# Patient Record
Sex: FEMALE | URBAN - METROPOLITAN AREA
[De-identification: names, ages, dates, MRNs, and addresses within clinical notes are randomized per-mention and may not be internally consistent; named-entity substitution may affect disease eponyms.]

---

## 2020-05-07 ENCOUNTER — APPOINTMENT (RX ONLY)
Dept: URBAN - METROPOLITAN AREA CLINIC 48 | Facility: CLINIC | Age: 55
Setting detail: DERMATOLOGY
End: 2020-05-07

## 2020-05-07 VITALS — TEMPERATURE: 97.7 F

## 2020-05-07 PROBLEM — D48.5 NEOPLASM OF UNCERTAIN BEHAVIOR OF SKIN: Status: ACTIVE | Noted: 2020-05-07

## 2020-05-07 PROCEDURE — ? COUNSELING

## 2020-05-07 PROCEDURE — ? BIOPSY BY SHAVE METHOD

## 2020-05-07 PROCEDURE — 11102 TANGNTL BX SKIN SINGLE LES: CPT

## 2020-05-07 NOTE — PROCEDURE: BIOPSY BY SHAVE METHOD
Detail Level: Detailed
Depth Of Biopsy: dermis
Was A Bandage Applied: Yes
Size Of Lesion In Cm: 0
Accession #: [id number]
Biopsy Type: H and E
Biopsy Method: Dermablade
Anesthesia Type: 1% lidocaine with epinephrine
Anesthesia Volume In Cc (Will Not Render If 0): 0.5
Hemostasis: Electrocautery and Aluminum Chloride
Wound Care: Petrolatum
Dressing: bandage
Destruction After The Procedure: No
Type Of Destruction Used: Curettage
Curettage Text: The wound bed was treated with curettage after the biopsy was performed.
Cryotherapy Text: The wound bed was treated with cryotherapy after the biopsy was performed.
Electrodesiccation Text: The wound bed was treated with electrodesiccation after the biopsy was performed.
Electrodesiccation And Curettage Text: The wound bed was treated with electrodesiccation and curettage after the biopsy was performed.
Silver Nitrate Text: The wound bed was treated with silver nitrate after the biopsy was performed.
Lab: -[id number]
Lab Facility: [id number]
Consent: Written consent was obtained and risks were reviewed including but not limited to scarring, infection, bleeding, scabbing, incomplete removal, nerve damage and allergy to anesthesia.
Post-Care Instructions: I reviewed with the patient in detail post-care instructions. Patient is to keep the biopsy site dry overnight, and then apply bacitracin twice daily until healed. Patient may apply hydrogen peroxide soaks to remove any crusting.
Notification Instructions: Patient will be notified of biopsy results. However, patient instructed to call the office if not contacted within 2 weeks.
Billing Type: Third-Party Bill
Information: Selecting Yes will display possible errors in your note based on the variables you have selected. This validation is only offered as a suggestion for you. PLEASE NOTE THAT THE VALIDATION TEXT WILL BE REMOVED WHEN YOU FINALIZE YOUR NOTE. IF YOU WANT TO FAX A PRELIMINARY NOTE YOU WILL NEED TO TOGGLE THIS TO 'NO' IF YOU DO NOT WANT IT IN YOUR FAXED NOTE.

## 2020-05-07 NOTE — HPI: SKIN LESION
Is This A New Presentation, Or A Follow-Up?: Skin Lesion
What Type Of Note Output Would You Prefer (Optional)?: Standard Output
How Severe Is Your Skin Lesion?: moderate
Has Your Skin Lesion Been Treated?: not been treated
Additional History: Patient is afebrile at presentation and denies any symptoms consistent with covid infection; denies any recent travel.

## 2023-09-21 ENCOUNTER — LAB (OUTPATIENT)
Dept: LAB | Facility: HOSPITAL | Age: 58
End: 2023-09-21

## 2023-09-21 ENCOUNTER — OFFICE VISIT (OUTPATIENT)
Dept: FAMILY MEDICINE CLINIC | Facility: CLINIC | Age: 58
End: 2023-09-21
Payer: COMMERCIAL

## 2023-09-21 VITALS
HEIGHT: 69 IN | TEMPERATURE: 98.2 F | DIASTOLIC BLOOD PRESSURE: 76 MMHG | WEIGHT: 236 LBS | BODY MASS INDEX: 34.96 KG/M2 | HEART RATE: 74 BPM | SYSTOLIC BLOOD PRESSURE: 130 MMHG

## 2023-09-21 DIAGNOSIS — Z12.31 ENCOUNTER FOR SCREENING MAMMOGRAM FOR MALIGNANT NEOPLASM OF BREAST: ICD-10-CM

## 2023-09-21 DIAGNOSIS — Z00.00 ENCOUNTER FOR ANNUAL PHYSICAL EXAM: Primary | ICD-10-CM

## 2023-09-21 DIAGNOSIS — M72.2 PLANTAR FASCIITIS OF RIGHT FOOT: ICD-10-CM

## 2023-09-21 DIAGNOSIS — Z00.00 ENCOUNTER FOR ANNUAL PHYSICAL EXAM: ICD-10-CM

## 2023-09-21 DIAGNOSIS — Z12.11 ENCOUNTER FOR SCREENING FOR MALIGNANT NEOPLASM OF COLON: ICD-10-CM

## 2023-09-21 DIAGNOSIS — Z23 NEED FOR VACCINATION: ICD-10-CM

## 2023-09-21 LAB
ALBUMIN SERPL-MCNC: 4.4 G/DL (ref 3.5–5.2)
ALBUMIN/GLOB SERPL: 1.6 G/DL
ALP SERPL-CCNC: 86 U/L (ref 39–117)
ALT SERPL W P-5'-P-CCNC: 27 U/L (ref 1–33)
ANION GAP SERPL CALCULATED.3IONS-SCNC: 9.5 MMOL/L (ref 5–15)
AST SERPL-CCNC: 25 U/L (ref 1–32)
BASOPHILS # BLD AUTO: 0.06 10*3/MM3 (ref 0–0.2)
BASOPHILS NFR BLD AUTO: 0.9 % (ref 0–1.5)
BILIRUB SERPL-MCNC: 0.5 MG/DL (ref 0–1.2)
BUN SERPL-MCNC: 12 MG/DL (ref 6–20)
BUN/CREAT SERPL: 15 (ref 7–25)
CALCIUM SPEC-SCNC: 9.9 MG/DL (ref 8.6–10.5)
CHLORIDE SERPL-SCNC: 105 MMOL/L (ref 98–107)
CHOLEST SERPL-MCNC: 159 MG/DL (ref 0–200)
CO2 SERPL-SCNC: 26.5 MMOL/L (ref 22–29)
CREAT SERPL-MCNC: 0.8 MG/DL (ref 0.57–1)
DEPRECATED RDW RBC AUTO: 41.1 FL (ref 37–54)
EGFRCR SERPLBLD CKD-EPI 2021: 86.1 ML/MIN/1.73
EOSINOPHIL # BLD AUTO: 0.15 10*3/MM3 (ref 0–0.4)
EOSINOPHIL NFR BLD AUTO: 2.3 % (ref 0.3–6.2)
ERYTHROCYTE [DISTWIDTH] IN BLOOD BY AUTOMATED COUNT: 12.4 % (ref 12.3–15.4)
GLOBULIN UR ELPH-MCNC: 2.7 GM/DL
GLUCOSE SERPL-MCNC: 91 MG/DL (ref 65–99)
HBA1C MFR BLD: 6.2 % (ref 4.8–5.6)
HCT VFR BLD AUTO: 43.6 % (ref 34–46.6)
HDLC SERPL-MCNC: 52 MG/DL (ref 40–60)
HGB BLD-MCNC: 14.7 G/DL (ref 12–15.9)
IMM GRANULOCYTES # BLD AUTO: 0.03 10*3/MM3 (ref 0–0.05)
IMM GRANULOCYTES NFR BLD AUTO: 0.5 % (ref 0–0.5)
LDLC SERPL CALC-MCNC: 94 MG/DL (ref 0–100)
LDLC/HDLC SERPL: 1.81 {RATIO}
LYMPHOCYTES # BLD AUTO: 2.07 10*3/MM3 (ref 0.7–3.1)
LYMPHOCYTES NFR BLD AUTO: 31.4 % (ref 19.6–45.3)
MCH RBC QN AUTO: 30.9 PG (ref 26.6–33)
MCHC RBC AUTO-ENTMCNC: 33.7 G/DL (ref 31.5–35.7)
MCV RBC AUTO: 91.6 FL (ref 79–97)
MONOCYTES # BLD AUTO: 0.32 10*3/MM3 (ref 0.1–0.9)
MONOCYTES NFR BLD AUTO: 4.9 % (ref 5–12)
NEUTROPHILS NFR BLD AUTO: 3.96 10*3/MM3 (ref 1.7–7)
NEUTROPHILS NFR BLD AUTO: 60 % (ref 42.7–76)
NRBC BLD AUTO-RTO: 0 /100 WBC (ref 0–0.2)
PLATELET # BLD AUTO: 266 10*3/MM3 (ref 140–450)
PMV BLD AUTO: 10.3 FL (ref 6–12)
POTASSIUM SERPL-SCNC: 4.4 MMOL/L (ref 3.5–5.2)
PROT SERPL-MCNC: 7.1 G/DL (ref 6–8.5)
RBC # BLD AUTO: 4.76 10*6/MM3 (ref 3.77–5.28)
SODIUM SERPL-SCNC: 141 MMOL/L (ref 136–145)
T4 FREE SERPL-MCNC: 1.17 NG/DL (ref 0.93–1.7)
TRIGL SERPL-MCNC: 65 MG/DL (ref 0–150)
TSH SERPL DL<=0.05 MIU/L-ACNC: 4.51 UIU/ML (ref 0.27–4.2)
VLDLC SERPL-MCNC: 13 MG/DL (ref 5–40)
WBC NRBC COR # BLD: 6.59 10*3/MM3 (ref 3.4–10.8)

## 2023-09-21 PROCEDURE — 80061 LIPID PANEL: CPT

## 2023-09-21 PROCEDURE — 80050 GENERAL HEALTH PANEL: CPT

## 2023-09-21 PROCEDURE — 84439 ASSAY OF FREE THYROXINE: CPT

## 2023-09-21 PROCEDURE — 83036 HEMOGLOBIN GLYCOSYLATED A1C: CPT

## 2023-09-21 NOTE — PROGRESS NOTES
Subjective     Chief Complaint  Foot Injury (Believes she has plantar fascitis ) and Immunizations (Zoster)    Subjective          Amanda Pack is a 58 y.o. female who presents today to [facility] for initial evaluation .    HPI:   History of Present Illness      Ms. Pack is a pleasant 58-year-old female presents today to establish care with a primary care physician and have a annual physical exam.  It does not appear that she has any significant past medical history.  She is not taking any chronic medications.  She does have a past medical history of diabetes.  She would like to update her immunizations.      She has an acute complaint of a foot injury.  She is concern for Planter fasciitis. She has has plantar fascitis in the past. She was able to use stretching exercising. Frozen water bottle etc. She reports that this episode has been so painful that it is interfering with her activities. She has been taking aleve/ibuprofen with some benefit but no long term. She wears supportive shoes routinely.     The following portions of the patient's history were reviewed and updated as appropriate: allergies, current medications, past family history, past medical history, past social history, past surgical history and problem list.    Objective     Objective     Allergy:   No Known Allergies     Current Medications:   No current outpatient medications on file.     No current facility-administered medications for this visit.       Past Medical History:  History reviewed. No pertinent past medical history.    Past Surgical History:  Past Surgical History:   Procedure Laterality Date    BILATERAL BREAST REDUCTION       SECTION      ENDOMETRIAL ABLATION         Social History:  Social History     Socioeconomic History    Marital status:    Tobacco Use    Smoking status: Never     Passive exposure: Never    Smokeless tobacco: Never   Vaping Use    Vaping Use: Never used  "  Substance and Sexual Activity    Alcohol use: Yes     Comment: 1-2 glasses occasionally    Drug use: Never    Sexual activity: Defer       Family History:  Family History   Problem Relation Age of Onset    Cancer Sister     Diabetes Paternal Uncle            Vital Signs:   /76   Pulse 74   Temp 98.2 °F (36.8 °C) (Infrared)   Ht 175.9 cm (69.25\")   Wt 107 kg (236 lb)   BMI 34.60 kg/m²      Physical Exam:  Physical Exam  Constitutional:       Appearance: She is normal weight.   HENT:      Head: Normocephalic.      Right Ear: Tympanic membrane normal.   Cardiovascular:      Rate and Rhythm: Normal rate and regular rhythm.      Pulses: Normal pulses.      Heart sounds: No murmur heard.  Pulmonary:      Effort: Pulmonary effort is normal. No respiratory distress.      Breath sounds: No wheezing.   Abdominal:      General: Abdomen is flat. Bowel sounds are normal.   Musculoskeletal:         General: No swelling or tenderness.      Cervical back: Normal range of motion.   Neurological:      General: No focal deficit present.      Mental Status: She is alert. Mental status is at baseline.   Psychiatric:         Mood and Affect: Mood normal.         Behavior: Behavior normal.         Thought Content: Thought content normal.         Judgment: Judgment normal.             PHQ-9 Score  PHQ-9 Total Score: 0     Lab Review  No results found for any previous visit.        Radiology Results        Assessment / Plan         Assessment and Plan   Diagnoses and all orders for this visit:    1. Encounter for annual physical exam (Primary)  Assessment & Plan:  Annual wellness exam completed today. Health Maintenance including immunizations was updated and reflected in the chart. Yearly screening labs were ordered.     Further recommendations to be given once lab data received.     Health advice: healthy food choices with fresh fruits and vegetables, maintain sleep pattern at least 8 hours, avoid texting and distracted " driving practices; wear safety belt, engage in regular exercise, maintain healthy weight, use safe sex practices, avoid alcohol and illicit drugs. Maintain immunizations that are up to date. Maintain health maintenance: Colonoscopy DEXA, Mammo, PAP, etc.  Follow up with PCP if struggling with depression or anxiety. Keep regular dental and eye exams. Brush and floss teeth daily.     F/U annually and prn.       Orders:  -     CBC & Differential; Future  -     Comprehensive Metabolic Panel; Future  -     Hemoglobin A1c; Future  -     Lipid Panel; Future  -     TSH Rfx On Abnormal To Free T4; Future    2. Plantar fasciitis of right foot  -     Ambulatory Referral to Podiatry    3. Encounter for screening mammogram for malignant neoplasm of breast  -     Mammo Screening Digital Tomosynthesis Bilateral With CAD; Future    4. Encounter for screening for malignant neoplasm of colon  -     Ambulatory Referral to Gastroenterology    5. Need for vaccination  -     Shingrix Vaccine        Discussed possible differential diagnoses, testing, treatment, recommended non-pharmacological interventions, risks, warning signs to monitor for that would indicate need for follow-up in clinic or ER. If no improvement with these regimens or you have new or worsening symptoms follow-up. Patient verbalizes understanding and agreement with plan of care. Denies further needs or concerns.     Patient was given instructions and counseling regarding her condition and for health maintenance advised.    BMI is >= 30 and <35. (Class 1 Obesity). The following options were offered after discussion;: weight loss educational material (shared in after visit summary)         Health Maintenance  Health Maintenance:   Health Maintenance Due   Topic Date Due    MAMMOGRAM  Never done    BMI FOLLOWUP  Never done    HEPATITIS C SCREENING  Never done        Meds ordered during this visit  No orders of the defined types were placed in this encounter.      Meds  stopped during this visit:  There are no discontinued medications.     Visit Diagnoses    ICD-10-CM ICD-9-CM   1. Encounter for annual physical exam  Z00.00 V70.0   2. Plantar fasciitis of right foot  M72.2 728.71   3. Encounter for screening mammogram for malignant neoplasm of breast  Z12.31 V76.12   4. Encounter for screening for malignant neoplasm of colon  Z12.11 V76.51   5. Need for vaccination  Z23 V05.9       Patient was given instructions and counseling regarding her condition or for health maintenance advice. Please see specific information pulled into the AVS if appropriate.     Follow Up   Return for Annual.          This document has been electronically signed by Marzena Whittington DO   September 21, 2023 10:38 EDT    Dictated Utilizing Dragon Dictation: Part of this note may be an electronic transcription/translation of spoken language to printed text using the Dragon Dictation System.    Marzena Whittington D.O.  [facility] [place]

## 2023-09-21 NOTE — LETTER
[facility]  Vaccine Consent Form    Patient Name:  Amanda Pack  Patient :  1965     Vaccine(s) Ordered    Shingrix Vaccine        Screening Checklist  The following questions should be completed prior to vaccination. If you answer “yes” to any question, it does not necessarily mean you should not be vaccinated. It just means we may need to clarify or ask more questions. If a question is unclear, please ask your healthcare provider to explain it.    Yes No   Any fever or moderate to severe illness today (mild illness and/or antibiotic treatment are not contraindications)?     Do you have a history of a serious reaction to any previous vaccinations, such as anaphylaxis, encephalopathy within 7 days, Guillain-Berkeley syndrome within 6 weeks, seizure?     Have you received any live vaccine(s) in the past month (MMR, SARA)?     Do you have an anaphylactic allergy to latex (DTaP, DTaP-IPV, Hep A, Hep B, MenB, RV, Td, Tdap), baker’s yeast (Hep B, HPV), or gelatin (SARA, MMR)?     Do you have an anaphylactic allergy to neomycin (Rabies, SARA, MMR, IPV, Hep A), polymyxin B (IPV), or streptomycin (IPV)?      Any cancer, leukemia, AIDS, or other immune system disorder? (SARA, MMR, RV)     Do you have a parent, brother, or sister with an immune system problem (if immune competence of vaccine recipient clinically verified, can proceed)? (MMR, SARA)     Any recent steroid treatments for >2 weeks, chemotherapy, or radiation treatment? (SARA, MMR)     Have you received antibody-containing blood transfusions or IVIG in the past 11 months (recommended interval is dependent on product)? (MMR, SARA)     Have you taken antiviral drugs (acyclovir, famciclovir, valacyclovir) in the last 24 or 48 hours, respectively (SRAA)?      Are you pregnant or planning to become pregnant within 1 month? (SARA, MMR, HPV, IPV, MenB; For hep B- refer to Engerix-B)     For infants, have you ever been told your child has had intussusception or a medical  emergency involving obstruction of the intestine (RV)? If not for an infant, can skip this question.         *Ordering Physician/APC should be consulted if “yes” is checked by the patient or guardian above.      I have received, read, and understand the Vaccine Information Statement (VIS) for each vaccine ordered above.  I have considered my health status as well as the health status of my close contacts.  I have taken the opportunity to discuss my vaccine questions with my health care provider.   I have requested that the ordered vaccine(s) be given to me.  I understand the benefits and risks of the vaccines.  I understand that I should remain in the clinic for 15 minutes after receiving the vaccine(s).  _________________________________________________________  Signature of Patient or Parent/Legal Guardian ____________________  Date

## 2023-09-21 NOTE — ASSESSMENT & PLAN NOTE
Annual wellness exam completed today. Health Maintenance including immunizations was updated and reflected in the chart. Yearly screening labs were ordered.     Further recommendations to be given once lab data received.     Health advice: healthy food choices with fresh fruits and vegetables, maintain sleep pattern at least 8 hours, avoid texting and distracted driving practices; wear safety belt, engage in regular exercise, maintain healthy weight, use safe sex practices, avoid alcohol and illicit drugs. Maintain immunizations that are up to date. Maintain health maintenance: Colonoscopy DEXA, Mammo, PAP, etc.  Follow up with PCP if struggling with depression or anxiety. Keep regular dental and eye exams. Brush and floss teeth daily.     F/U annually and prn.

## 2023-09-22 DIAGNOSIS — R94.6 ABNORMAL RESULTS OF THYROID FUNCTION STUDIES: Primary | ICD-10-CM

## 2023-11-06 RX ORDER — SODIUM, POTASSIUM,MAG SULFATES 17.5-3.13G
2 SOLUTION, RECONSTITUTED, ORAL ORAL TAKE AS DIRECTED
Qty: 354 ML | Refills: 0 | Status: SHIPPED | OUTPATIENT
Start: 2023-11-06

## 2023-11-09 ENCOUNTER — HOSPITAL ENCOUNTER (OUTPATIENT)
Dept: MAMMOGRAPHY | Facility: HOSPITAL | Age: 58
Discharge: HOME | End: 2023-11-09
Admitting: FAMILY MEDICINE
Payer: COMMERCIAL

## 2023-11-09 ENCOUNTER — APPOINTMENT (OUTPATIENT)
Dept: OTHER | Facility: HOSPITAL | Age: 58
End: 2023-11-09
Payer: COMMERCIAL

## 2023-11-09 DIAGNOSIS — Z12.31 ENCOUNTER FOR SCREENING MAMMOGRAM FOR MALIGNANT NEOPLASM OF BREAST: ICD-10-CM

## 2023-11-09 PROCEDURE — 77063 BREAST TOMOSYNTHESIS BI: CPT

## 2023-11-09 PROCEDURE — 77067 SCR MAMMO BI INCL CAD: CPT

## 2023-11-13 ENCOUNTER — OUTSIDE FACILITY SERVICE (OUTPATIENT)
Dept: GASTROENTEROLOGY | Facility: CLINIC | Age: 58
End: 2023-11-13
Payer: COMMERCIAL

## 2023-11-13 PROCEDURE — 45380 COLONOSCOPY AND BIOPSY: CPT | Performed by: INTERNAL MEDICINE

## 2023-11-13 PROCEDURE — 88305 TISSUE EXAM BY PATHOLOGIST: CPT

## 2023-11-14 ENCOUNTER — LAB REQUISITION (OUTPATIENT)
Dept: LAB | Facility: HOSPITAL | Age: 58
End: 2023-11-14
Payer: COMMERCIAL

## 2023-11-14 DIAGNOSIS — Z86.010 PERSONAL HISTORY OF COLONIC POLYPS: ICD-10-CM

## 2023-11-14 DIAGNOSIS — Z12.11 ENCOUNTER FOR SCREENING FOR MALIGNANT NEOPLASM OF COLON: ICD-10-CM

## 2023-11-14 DIAGNOSIS — D12.8 BENIGN NEOPLASM OF RECTUM: ICD-10-CM

## 2023-11-15 LAB — REF LAB TEST METHOD: NORMAL

## 2024-01-03 ENCOUNTER — HOSPITAL ENCOUNTER (OUTPATIENT)
Dept: MAMMOGRAPHY | Facility: HOSPITAL | Age: 59
Discharge: HOME | End: 2024-01-03
Admitting: RADIOLOGY
Payer: COMMERCIAL

## 2024-01-03 DIAGNOSIS — R92.8 ABNORMAL MAMMOGRAM: ICD-10-CM

## 2024-01-03 PROCEDURE — G0279 TOMOSYNTHESIS, MAMMO: HCPCS

## 2024-01-03 PROCEDURE — 77065 DX MAMMO INCL CAD UNI: CPT

## 2024-07-23 ENCOUNTER — PATIENT ROUNDING (BHMG ONLY) (OUTPATIENT)
Dept: URGENT CARE | Facility: CLINIC | Age: 59
End: 2024-07-23
Payer: COMMERCIAL

## 2024-07-23 NOTE — ED NOTES
Thank you for letting us care for you in your recent visit to our urgent care center. We would love to hear about your experience with us. Was this the first time you have visited our location?    We’re always looking for ways to make our patients’ experiences even better. Do you have any recommendations on ways we may improve?     I appreciate you taking the time to respond. Please be on the lookout for a survey about your recent visit from [employer] via text or email. We would greatly appreciate if you could fill that out and turn it back in. We want your voice to be heard and we value your feedback.   Thank you for choosing [facility] for your healthcare needs.

## 2024-09-29 LAB
NCCN CRITERIA FLAG: NORMAL
TYRER CUZICK SCORE: 9.7

## 2024-10-14 ENCOUNTER — HOSPITAL ENCOUNTER (OUTPATIENT)
Facility: HOSPITAL | Age: 59
Discharge: HOME | End: 2024-10-14
Admitting: RADIOLOGY
Payer: COMMERCIAL

## 2024-10-14 DIAGNOSIS — R92.8 ABNORMAL MAMMOGRAM: ICD-10-CM

## 2024-10-14 PROCEDURE — 77062 BREAST TOMOSYNTHESIS BI: CPT | Performed by: RADIOLOGY

## 2024-10-14 PROCEDURE — 77066 DX MAMMO INCL CAD BI: CPT

## 2024-10-14 PROCEDURE — G0279 TOMOSYNTHESIS, MAMMO: HCPCS

## 2024-10-14 PROCEDURE — 77066 DX MAMMO INCL CAD BI: CPT | Performed by: RADIOLOGY

## 2024-10-14 PROCEDURE — 77065 DX MAMMO INCL CAD UNI: CPT

## 2024-10-28 ENCOUNTER — OFFICE VISIT (OUTPATIENT)
Dept: FAMILY MEDICINE CLINIC | Facility: CLINIC | Age: 59
End: 2024-10-28
Payer: COMMERCIAL

## 2024-10-28 ENCOUNTER — LAB (OUTPATIENT)
Dept: LAB | Facility: HOSPITAL | Age: 59
End: 2024-10-28
Payer: COMMERCIAL

## 2024-10-28 VITALS
SYSTOLIC BLOOD PRESSURE: 98 MMHG | HEIGHT: 69 IN | BODY MASS INDEX: 35.43 KG/M2 | WEIGHT: 239.2 LBS | HEART RATE: 72 BPM | DIASTOLIC BLOOD PRESSURE: 60 MMHG | TEMPERATURE: 98.6 F | OXYGEN SATURATION: 97 %

## 2024-10-28 DIAGNOSIS — Z11.59 NEED FOR HEPATITIS C SCREENING TEST: ICD-10-CM

## 2024-10-28 DIAGNOSIS — Z00.00 ENCOUNTER FOR ANNUAL PHYSICAL EXAM: ICD-10-CM

## 2024-10-28 DIAGNOSIS — Z78.0 POST-MENOPAUSAL: ICD-10-CM

## 2024-10-28 DIAGNOSIS — R73.03 PREDIABETES: ICD-10-CM

## 2024-10-28 DIAGNOSIS — Z00.00 ENCOUNTER FOR ANNUAL PHYSICAL EXAM: Primary | ICD-10-CM

## 2024-10-28 DIAGNOSIS — R20.2 PARESTHESIA OF SKIN: ICD-10-CM

## 2024-10-28 DIAGNOSIS — Z23 NEED FOR VACCINATION: ICD-10-CM

## 2024-10-28 LAB
25(OH)D3 SERPL-MCNC: 25.6 NG/ML (ref 30–100)
ALBUMIN SERPL-MCNC: 4.4 G/DL (ref 3.5–5.2)
ALBUMIN/GLOB SERPL: 1.3 G/DL
ALP SERPL-CCNC: 99 U/L (ref 39–117)
ALT SERPL W P-5'-P-CCNC: 34 U/L (ref 1–33)
ANION GAP SERPL CALCULATED.3IONS-SCNC: 7.6 MMOL/L (ref 5–15)
AST SERPL-CCNC: 24 U/L (ref 1–32)
BASOPHILS # BLD AUTO: 0.05 10*3/MM3 (ref 0–0.2)
BASOPHILS NFR BLD AUTO: 0.7 % (ref 0–1.5)
BILIRUB SERPL-MCNC: 0.6 MG/DL (ref 0–1.2)
BUN SERPL-MCNC: 14 MG/DL (ref 6–20)
BUN/CREAT SERPL: 13.3 (ref 7–25)
CALCIUM SPEC-SCNC: 10.1 MG/DL (ref 8.6–10.5)
CHLORIDE SERPL-SCNC: 102 MMOL/L (ref 98–107)
CHOLEST SERPL-MCNC: 167 MG/DL (ref 0–200)
CO2 SERPL-SCNC: 27.4 MMOL/L (ref 22–29)
CREAT SERPL-MCNC: 1.05 MG/DL (ref 0.57–1)
DEPRECATED RDW RBC AUTO: 41.6 FL (ref 37–54)
EGFRCR SERPLBLD CKD-EPI 2021: 61.7 ML/MIN/1.73
EOSINOPHIL # BLD AUTO: 0.15 10*3/MM3 (ref 0–0.4)
EOSINOPHIL NFR BLD AUTO: 2.2 % (ref 0.3–6.2)
ERYTHROCYTE [DISTWIDTH] IN BLOOD BY AUTOMATED COUNT: 12.1 % (ref 12.3–15.4)
FERRITIN SERPL-MCNC: 194 NG/ML (ref 13–150)
FOLATE SERPL-MCNC: 7.91 NG/ML (ref 4.78–24.2)
FSH SERPL-ACNC: 44.5 MIU/ML
GLOBULIN UR ELPH-MCNC: 3.3 GM/DL
GLUCOSE SERPL-MCNC: 130 MG/DL (ref 65–99)
HBA1C MFR BLD: 6.7 % (ref 4.8–5.6)
HCT VFR BLD AUTO: 45.4 % (ref 34–46.6)
HCV AB SER QL: NORMAL
HDLC SERPL-MCNC: 51 MG/DL (ref 40–60)
HGB BLD-MCNC: 15.6 G/DL (ref 12–15.9)
IMM GRANULOCYTES # BLD AUTO: 0.02 10*3/MM3 (ref 0–0.05)
IMM GRANULOCYTES NFR BLD AUTO: 0.3 % (ref 0–0.5)
IRON 24H UR-MRATE: 134 MCG/DL (ref 37–145)
IRON SATN MFR SERPL: 34 % (ref 20–50)
LDLC SERPL CALC-MCNC: 101 MG/DL (ref 0–100)
LDLC/HDLC SERPL: 1.97 {RATIO}
LH SERPL-ACNC: 23.8 MIU/ML
LYMPHOCYTES # BLD AUTO: 2.06 10*3/MM3 (ref 0.7–3.1)
LYMPHOCYTES NFR BLD AUTO: 30.4 % (ref 19.6–45.3)
MCH RBC QN AUTO: 32.4 PG (ref 26.6–33)
MCHC RBC AUTO-ENTMCNC: 34.4 G/DL (ref 31.5–35.7)
MCV RBC AUTO: 94.4 FL (ref 79–97)
MONOCYTES # BLD AUTO: 0.3 10*3/MM3 (ref 0.1–0.9)
MONOCYTES NFR BLD AUTO: 4.4 % (ref 5–12)
NEUTROPHILS NFR BLD AUTO: 4.19 10*3/MM3 (ref 1.7–7)
NEUTROPHILS NFR BLD AUTO: 62 % (ref 42.7–76)
NRBC BLD AUTO-RTO: 0 /100 WBC (ref 0–0.2)
PLATELET # BLD AUTO: 302 10*3/MM3 (ref 140–450)
PMV BLD AUTO: 10.1 FL (ref 6–12)
POTASSIUM SERPL-SCNC: 4.2 MMOL/L (ref 3.5–5.2)
PROLACTIN SERPL-MCNC: 8.38 NG/ML (ref 4.79–23.3)
PROT SERPL-MCNC: 7.7 G/DL (ref 6–8.5)
RBC # BLD AUTO: 4.81 10*6/MM3 (ref 3.77–5.28)
SODIUM SERPL-SCNC: 137 MMOL/L (ref 136–145)
T4 FREE SERPL-MCNC: 1.18 NG/DL (ref 0.92–1.68)
TIBC SERPL-MCNC: 399 MCG/DL (ref 298–536)
TRANSFERRIN SERPL-MCNC: 268 MG/DL (ref 200–360)
TRIGL SERPL-MCNC: 78 MG/DL (ref 0–150)
TSH SERPL DL<=0.05 MIU/L-ACNC: 5.69 UIU/ML (ref 0.27–4.2)
VIT B12 BLD-MCNC: 604 PG/ML (ref 211–946)
VLDLC SERPL-MCNC: 15 MG/DL (ref 5–40)
WBC NRBC COR # BLD AUTO: 6.77 10*3/MM3 (ref 3.4–10.8)

## 2024-10-28 PROCEDURE — 82626 DEHYDROEPIANDROSTERONE: CPT

## 2024-10-28 PROCEDURE — 80061 LIPID PANEL: CPT

## 2024-10-28 PROCEDURE — 83002 ASSAY OF GONADOTROPIN (LH): CPT

## 2024-10-28 PROCEDURE — 83540 ASSAY OF IRON: CPT

## 2024-10-28 PROCEDURE — 84439 ASSAY OF FREE THYROXINE: CPT

## 2024-10-28 PROCEDURE — 84146 ASSAY OF PROLACTIN: CPT

## 2024-10-28 PROCEDURE — 82672 ASSAY OF ESTROGEN: CPT

## 2024-10-28 PROCEDURE — 82607 VITAMIN B-12: CPT

## 2024-10-28 PROCEDURE — 99396 PREV VISIT EST AGE 40-64: CPT | Performed by: FAMILY MEDICINE

## 2024-10-28 PROCEDURE — 86803 HEPATITIS C AB TEST: CPT

## 2024-10-28 PROCEDURE — 83001 ASSAY OF GONADOTROPIN (FSH): CPT

## 2024-10-28 PROCEDURE — 90656 IIV3 VACC NO PRSV 0.5 ML IM: CPT | Performed by: FAMILY MEDICINE

## 2024-10-28 PROCEDURE — 84466 ASSAY OF TRANSFERRIN: CPT

## 2024-10-28 PROCEDURE — 82746 ASSAY OF FOLIC ACID SERUM: CPT

## 2024-10-28 PROCEDURE — 80050 GENERAL HEALTH PANEL: CPT

## 2024-10-28 PROCEDURE — 82306 VITAMIN D 25 HYDROXY: CPT

## 2024-10-28 PROCEDURE — 90471 IMMUNIZATION ADMIN: CPT | Performed by: FAMILY MEDICINE

## 2024-10-28 PROCEDURE — 36415 COLL VENOUS BLD VENIPUNCTURE: CPT

## 2024-10-28 PROCEDURE — 83036 HEMOGLOBIN GLYCOSYLATED A1C: CPT

## 2024-10-28 PROCEDURE — 82728 ASSAY OF FERRITIN: CPT

## 2024-10-28 RX ORDER — PHENOL 1.4 %
1 AEROSOL, SPRAY (ML) MUCOUS MEMBRANE DAILY
COMMUNITY

## 2024-10-28 NOTE — PROGRESS NOTES
Subjective     Chief Complaint  Annual Exam (Thinks she's having symptoms related to menopause)    Subjective          Amanda Morenita Pack is a 59 y.o. female who presents today to [facility] for follow up.    HPI:   History of Present Illness    Ms. Pack is a pleasant 59-year-old female presents today to have a annual physical exam.  She does not take any chronic medications.     She has been having some concerns about possible menopausal symptoms.  She states that she just doesn't feel physically herself. Sometimes she has some feet burning at night and soreness especially in the morning. Along with some muscle spasm at times, in the inner thigh. She is having some joint achiness as well as low energy and lack of focus. Sometimes she feels as if she is losing her words too. She has been feeling more issues with relaxation that isn't typical for her, as well. Symptoms are positive for vaginal dryness and low libido. Additionally she has some reflux at times. Her last menstrual period was in .     The following portions of the patient's history were reviewed and updated as appropriate: allergies, current medications, past family history, past medical history, past social history, past surgical history and problem list.    Objective     Objective     Allergy:   No Known Allergies     Current Medications:   Current Outpatient Medications   Medication Sig Dispense Refill    multivitamin with minerals (Multivitamin Women) tablet tablet Take 1 tablet by mouth Daily.       No current facility-administered medications for this visit.       Past Medical History:  History reviewed. No pertinent past medical history.    Past Surgical History:  Past Surgical History:   Procedure Laterality Date     SECTION      ENDOMETRIAL ABLATION      REDUCTION MAMMAPLASTY Bilateral 10/13/2022       Social History:  Social History     Socioeconomic History    Marital status:    Tobacco  "Use    Smoking status: Never     Passive exposure: Never    Smokeless tobacco: Never   Vaping Use    Vaping status: Never Used   Substance and Sexual Activity    Alcohol use: Yes     Comment: 1-2 glasses occasionally    Drug use: Never    Sexual activity: Defer       Family History:  Family History   Problem Relation Age of Onset    Cancer Sister     Diabetes Paternal Uncle     Breast cancer Neg Hx     Ovarian cancer Neg Hx          Vital Signs:   BP 98/60   Pulse 72   Temp 98.6 °F (37 °C) (Temporal)   Ht 175.3 cm (69.02\")   Wt 109 kg (239 lb 3.2 oz)   SpO2 97%   BMI 35.31 kg/m²      Physical Exam:  Physical Exam  Vitals reviewed.   Constitutional:       Appearance: She is not ill-appearing.   Cardiovascular:      Rate and Rhythm: Normal rate.      Pulses: Normal pulses.   Pulmonary:      Effort: Pulmonary effort is normal.      Breath sounds: Normal breath sounds.   Neurological:      General: No focal deficit present.      Mental Status: She is alert. Mental status is at baseline.   Psychiatric:         Mood and Affect: Mood normal.         Behavior: Behavior normal.         Thought Content: Thought content normal.         Judgment: Judgment normal.               PHQ-9 Score  PHQ-9 Total Score:      Lab Review  Orders Only on 09/29/2024   Component Date Value Ref Range Status    TyrerCuzick 09/29/2024 9.7   Final    NCCN 09/29/2024 NCCN not met   Final    High Risk Cancer Risk Assessment        Radiology Results  Mammo Diagnostic Digital Tomosynthesis Bilateral With CAD    Result Date: 10/14/2024  Impression: 1. Right breast: Stable focal asymmetry in the upper outer quadrant of the right breast for which the patient is in short-term follow-up. Previously seen probably benign calcifications are much less conspicuous. Follow-up diagnostic right mammogram in 6 months is recommended to ensure stability/establish new baseline after surgery. 2. Left breast: No suspicious abnormality is seen. Recommendation is for " continued routine annual screening mammogram. 3. Today's findings and recommendations were discussed with the patient at the time of the examination by the breast care team.  OVERALL ASSESSMENT: BI-RADS Category 3: Probably benign. Recommend short-term follow-up diagnostic right mammogram in 6 months.  ________________________________________________________________________ _______ Physicians Order  Diagnostic right mammogram and 6-months.  Diagnosis: Short-term follow-up probably benign findings  This report was finalized on 10/14/2024 9:20 AM by Yoselin Finnegan MD.        Assessment / Plan         Assessment and Plan   Diagnoses and all orders for this visit:    1. Encounter for annual physical exam (Primary)  Assessment & Plan:  Annual wellness exam completed today. Health Maintenance including immunizations was updated and reflected in the chart. Yearly screening labs were ordered.     Further recommendations to be given once lab data received.     Health advice: healthy food choices with fresh fruits and vegetables, maintain sleep pattern at least 8 hours, avoid texting and distracted driving practices; wear safety belt, engage in regular exercise, maintain healthy weight, use safe sex practices, avoid alcohol and illicit drugs. Maintain immunizations that are up to date. Maintain health maintenance:Colon cancer screening, Mammo, PAP, etc.  Follow up with PCP if struggling with depression or anxiety. Keep regular dental and eye exams. Brush and floss teeth daily.     I suggest they take a daily multivitamin that is age-appropriate (example women's One-A-Day.)  Patient voiced understanding of these instructions.     Follow up Annually for Physical       Orders:  -     Comprehensive Metabolic Panel; Future  -     CBC & Differential; Future  -     Hemoglobin A1c; Future  -     Lipid Panel; Future  -     TSH Rfx On Abnormal To Free T4; Future  -     Vitamin D,25-Hydroxy; Future  -     Vitamin B12; Future    2.  Need for hepatitis C screening test  -     Hepatitis C Antibody; Future    3. Prediabetes  -     Hemoglobin A1c; Future    4. Paresthesia of skin  -     TSH Rfx On Abnormal To Free T4; Future  -     Vitamin D,25-Hydroxy; Future  -     Vitamin B12; Future  -     Iron Profile; Future  -     Ferritin; Future  -     Folate; Future    5. Need for vaccination  -     Fluzone >6mos ([id number])    6. Post-menopausal  -     FSH & LH; Future  -     Prolactin; Future  -     Estrogens, Total; Future  -     DHEA; Future        Discussed possible differential diagnoses, testing, treatment, recommended non-pharmacological interventions, risks, warning signs to monitor for that would indicate need for follow-up in clinic or ER. If no improvement with these regimens or you have new or worsening symptoms follow-up. Patient verbalizes understanding and agreement with plan of care. Denies further needs or concerns.     Patient was given instructions and counseling regarding her condition and for health maintenance advised.    Class 2 Severe Obesity (BMI >=35 and <=39.9). Obesity-related health conditions include the following: none. Obesity is unchanged. BMI is is above average; BMI management plan is completed. We discussed Information on healthy weight added to patient's after visit summary.                  Health Maintenance  Health Maintenance:   Health Maintenance Due   Topic Date Due    HEPATITIS C SCREENING  Never done    PAP SMEAR  Never done    COVID-19 Vaccine (4 - 2023-24 season) 09/01/2024        Meds ordered during this visit  No orders of the defined types were placed in this encounter.      Meds stopped during this visit:  Medications Discontinued During This Encounter   Medication Reason    cefdinir (OMNICEF) 300 MG capsule *Therapy completed    predniSONE (DELTASONE) 10 MG tablet *Therapy completed        Visit Diagnoses    ICD-10-CM ICD-9-CM   1. Encounter for annual physical exam  Z00.00 V70.0   2. Need for hepatitis  C screening test  Z11.59 V73.89   3. Prediabetes  R73.03 790.29   4. Paresthesia of skin  R20.2 782.0   5. Need for vaccination  Z23 V05.9   6. Post-menopausal  Z78.0 V49.81       Patient was given instructions and counseling regarding her condition or for health maintenance advice. Please see specific information pulled into the AVS if appropriate.     Follow Up   Return in about 1 year (around 10/28/2025) for Annual.      This document has been electronically signed by Marzena Whittington DO   October 28, 2024 08:23 EDT    Dictated Utilizing Dragon Dictation: Part of this note may be an electronic transcription/translation of spoken language to printed text using the Dragon Dictation System.    Marzena Whittington D.O.  [facility] [place]

## 2024-10-28 NOTE — ASSESSMENT & PLAN NOTE
Annual wellness exam completed today. Health Maintenance including immunizations was updated and reflected in the chart. Yearly screening labs were ordered.     Further recommendations to be given once lab data received.     Health advice: healthy food choices with fresh fruits and vegetables, maintain sleep pattern at least 8 hours, avoid texting and distracted driving practices; wear safety belt, engage in regular exercise, maintain healthy weight, use safe sex practices, avoid alcohol and illicit drugs. Maintain immunizations that are up to date. Maintain health maintenance:Colon cancer screening, Mammo, PAP, etc.  Follow up with PCP if struggling with depression or anxiety. Keep regular dental and eye exams. Brush and floss teeth daily.     I suggest they take a daily multivitamin that is age-appropriate (example women's One-A-Day.)  Patient voiced understanding of these instructions.     Follow up Annually for Physical

## 2024-10-29 DIAGNOSIS — R73.03 PREDIABETES: Primary | ICD-10-CM

## 2024-10-29 DIAGNOSIS — Z13.6 SCREENING FOR CARDIOVASCULAR CONDITION: ICD-10-CM

## 2024-11-02 LAB — ESTROGEN SERPL-MCNC: 87 PG/ML (ref 40–244)

## 2024-11-03 LAB — DHEA SERPL-MCNC: 238 NG/DL (ref 21–402)

## 2024-11-11 DIAGNOSIS — R79.89 ELEVATED SERUM CREATININE: Primary | ICD-10-CM

## 2024-11-11 DIAGNOSIS — Z01.419 WELL WOMAN EXAM: ICD-10-CM

## 2024-11-11 DIAGNOSIS — Z78.0 POST-MENOPAUSAL: Primary | ICD-10-CM

## 2024-11-11 DIAGNOSIS — R79.89 ABNORMAL THYROID STIMULATING HORMONE (TSH) LEVEL: ICD-10-CM

## 2024-11-26 ENCOUNTER — LAB (OUTPATIENT)
Dept: LAB | Facility: HOSPITAL | Age: 59
End: 2024-11-26

## 2024-11-26 ENCOUNTER — HOSPITAL ENCOUNTER (OUTPATIENT)
Facility: HOSPITAL | Age: 59
Discharge: HOME | End: 2024-11-26

## 2024-11-26 DIAGNOSIS — R79.89 ABNORMAL THYROID STIMULATING HORMONE (TSH) LEVEL: ICD-10-CM

## 2024-11-26 DIAGNOSIS — Z13.6 SCREENING FOR CARDIOVASCULAR CONDITION: ICD-10-CM

## 2024-11-26 DIAGNOSIS — R79.89 ELEVATED SERUM CREATININE: ICD-10-CM

## 2024-11-26 DIAGNOSIS — R73.03 PREDIABETES: ICD-10-CM

## 2024-11-26 LAB
ANION GAP SERPL CALCULATED.3IONS-SCNC: 7.9 MMOL/L (ref 5–15)
BUN SERPL-MCNC: 16 MG/DL (ref 6–20)
BUN/CREAT SERPL: 20.3 (ref 7–25)
CALCIUM SPEC-SCNC: 10 MG/DL (ref 8.6–10.5)
CHLORIDE SERPL-SCNC: 107 MMOL/L (ref 98–107)
CO2 SERPL-SCNC: 27.1 MMOL/L (ref 22–29)
CREAT SERPL-MCNC: 0.79 MG/DL (ref 0.57–1)
EGFRCR SERPLBLD CKD-EPI 2021: 86.8 ML/MIN/1.73
GLUCOSE SERPL-MCNC: 99 MG/DL (ref 65–99)
POTASSIUM SERPL-SCNC: 4.3 MMOL/L (ref 3.5–5.2)
SODIUM SERPL-SCNC: 142 MMOL/L (ref 136–145)
T4 FREE SERPL-MCNC: 1.19 NG/DL (ref 0.92–1.68)
TSH SERPL DL<=0.05 MIU/L-ACNC: 6.11 UIU/ML (ref 0.27–4.2)

## 2024-11-26 PROCEDURE — 84439 ASSAY OF FREE THYROXINE: CPT

## 2024-11-26 PROCEDURE — 36415 COLL VENOUS BLD VENIPUNCTURE: CPT

## 2024-11-26 PROCEDURE — 75571 CT HRT W/O DYE W/CA TEST: CPT

## 2024-11-26 PROCEDURE — 84443 ASSAY THYROID STIM HORMONE: CPT

## 2024-11-26 PROCEDURE — 80048 BASIC METABOLIC PNL TOTAL CA: CPT

## 2024-11-27 RX ORDER — LEVOTHYROXINE SODIUM 25 UG/1
25 TABLET ORAL DAILY
Qty: 90 TABLET | Refills: 1 | Status: SHIPPED | OUTPATIENT
Start: 2024-11-27

## 2025-01-21 ENCOUNTER — OFFICE VISIT (OUTPATIENT)
Dept: FAMILY MEDICINE CLINIC | Facility: CLINIC | Age: 60
End: 2025-01-21
Payer: COMMERCIAL

## 2025-01-21 VITALS
WEIGHT: 238 LBS | SYSTOLIC BLOOD PRESSURE: 112 MMHG | TEMPERATURE: 98 F | HEART RATE: 76 BPM | OXYGEN SATURATION: 96 % | BODY MASS INDEX: 35.13 KG/M2 | DIASTOLIC BLOOD PRESSURE: 72 MMHG

## 2025-01-21 DIAGNOSIS — E03.9 HYPOTHYROIDISM, UNSPECIFIED TYPE: Primary | ICD-10-CM

## 2025-01-21 DIAGNOSIS — R73.03 PREDIABETES: ICD-10-CM

## 2025-01-21 LAB
EXPIRATION DATE: ABNORMAL
HBA1C MFR BLD: 6.1 % (ref 4.5–5.7)
Lab: ABNORMAL

## 2025-01-21 PROCEDURE — 83036 HEMOGLOBIN GLYCOSYLATED A1C: CPT | Performed by: FAMILY MEDICINE

## 2025-01-21 PROCEDURE — 99214 OFFICE O/P EST MOD 30 MIN: CPT | Performed by: FAMILY MEDICINE

## 2025-01-21 RX ORDER — MULTIVIT-MIN/IRON/FOLIC ACID/K 18-600-40
CAPSULE ORAL
COMMUNITY
Start: 2024-10-28

## 2025-01-21 RX ORDER — LEVOTHYROXINE SODIUM 50 UG/1
50 TABLET ORAL DAILY
Qty: 90 TABLET | Refills: 1 | Status: SHIPPED | OUTPATIENT
Start: 2025-01-21

## 2025-01-21 RX ORDER — MINOXIDIL 5 %
SOLUTION, NON-ORAL TOPICAL
COMMUNITY
Start: 2024-11-09

## 2025-01-21 NOTE — ASSESSMENT & PLAN NOTE
Since she is still still symptomatic I suspect that her TSH is still elevated despite being on Synthroid 25 mcg daily, I am going go ahead and increase to 50 mcg daily and have patient repeat her thyroid studies in 4 to 6 weeks.

## 2025-01-21 NOTE — PROGRESS NOTES
Subjective     Chief Complaint  thyroid follow up (No concerns.)    Subjective          Amanda Pack is a 59 y.o. female who presents today to [facility] for follow up.    HPI:     Follow up/lab draw  Symptoms are: recurrent.   Onset was 1 to 5 years.   Symptoms occur: intermittently.  Symptoms include: joint pain and fatigue.   Pertinent negative symptoms include no abdominal pain, no anorexia, no change in stool, no chest pain, no chills, no congestion, no cough, no diaphoresis, no fever, no headaches, no joint swelling, no myalgias, no nausea, no neck pain, no numbness, no rash, no sore throat, no swollen glands, no dysuria, no vertigo, no visual change, no vomiting and no weakness.       Ms. Pack is a pleasant 59-year-old female presents today to follow up.     She has been experiencing a lot of fatigue and issues with weight loss.  She has been exercising and decreasing her glucose intake.  Labs obtained revealed hypothyroidism.  She has been placed on Synthroid 25 mcg daily.  Also, she was found to have impaired fasting glucose with a hemoglobin A1c of 6.7%.    The following portions of the patient's history were reviewed and updated as appropriate: allergies, current medications, past family history, past medical history, past social history, past surgical history and problem list.    Objective     Objective     Allergy:   No Known Allergies     Current Medications:   Current Outpatient Medications   Medication Sig Dispense Refill   • Cholecalciferol (Vitamin D) 50 MCG (2000 UT) capsule      • levothyroxine (Synthroid) 50 MCG tablet Take 1 tablet by mouth Daily. 90 tablet 1   • minoxidil (Minoxidil for Women) 2 % external solution        No current facility-administered medications for this visit.       Past Medical History:  Past Medical History:   Diagnosis Date   • Allergic 2000    While living in [place], typically have Spring and Fall sinus infections.   •  Colon polyp 2019    Due for a retest   • GERD (gastroesophageal reflux disease) 25    Have noticed increase in heartburn regardless of dietary habits.   • Hypothyroidism        Past Surgical History:  Past Surgical History:   Procedure Laterality Date   • APPENDECTOMY     •  SECTION     • COLONOSCOPY  2019    Also in  or    • COSMETIC SURGERY  Oct 2022    Reast reduction; tummy tuck   • ENDOMETRIAL ABLATION     • REDUCTION MAMMAPLASTY Bilateral 10/13/2022   • TUBAL ABDOMINAL LIGATION         Social History:  Social History     Socioeconomic History   • Marital status:    Tobacco Use   • Smoking status: Never     Passive exposure: Never   • Smokeless tobacco: Never   Vaping Use   • Vaping status: Never Used   Substance and Sexual Activity   • Alcohol use: Yes     Alcohol/week: 2.0 standard drinks of alcohol     Types: 2 Glasses of wine per week     Comment: 1-2 glasses occasionally   • Drug use: Never   • Sexual activity: Yes     Partners: Male     Birth control/protection: Tubal ligation       Family History:  Family History   Problem Relation Age of Onset   • Cancer Sister    • Diabetes Paternal Uncle    • Diabetes Maternal Grandmother    • Breast cancer Neg Hx    • Ovarian cancer Neg Hx          Vital Signs:   /72   Pulse 76   Temp 98 °F (36.7 °C) (Temporal)   Wt 108 kg (238 lb)   SpO2 96%   BMI 35.13 kg/m²      Physical Exam:  Physical Exam  Vitals reviewed.   Constitutional:       Appearance: She is not ill-appearing.   Cardiovascular:      Rate and Rhythm: Normal rate.      Pulses: Normal pulses.   Pulmonary:      Effort: Pulmonary effort is normal.      Breath sounds: Normal breath sounds.   Neurological:      General: No focal deficit present.      Mental Status: She is alert. Mental status is at baseline.   Psychiatric:         Mood and Affect: Mood normal.         Behavior: Behavior normal.         Thought Content: Thought content normal.         Judgment:  Judgment normal.             PHQ-9 Score  PHQ-9 Total Score:      Lab Review  Office Visit on 01/21/2025   Component Date Value Ref Range Status   • Hemoglobin A1C 01/21/2025 6.1 (A)  4.5 - 5.7 % Final   • Lot Number 01/21/2025 [id number]   Final   • Expiration Date 01/21/2025 10/11/26   Final   Lab on 11/26/2024   Component Date Value Ref Range Status   • Glucose 11/26/2024 99  65 - 99 mg/dL Final   • BUN 11/26/2024 16  6 - 20 mg/dL Final   • Creatinine 11/26/2024 0.79  0.57 - 1.00 mg/dL Final   • Sodium 11/26/2024 142  136 - 145 mmol/L Final   • Potassium 11/26/2024 4.3  3.5 - 5.2 mmol/L Final   • Chloride 11/26/2024 107  98 - 107 mmol/L Final   • CO2 11/26/2024 27.1  22.0 - 29.0 mmol/L Final   • Calcium 11/26/2024 10.0  8.6 - 10.5 mg/dL Final   • BUN/Creatinine Ratio 11/26/2024 20.3  7.0 - 25.0 Final   • Anion Gap 11/26/2024 7.9  5.0 - 15.0 mmol/L Final   • eGFR 11/26/2024 86.8  >60.0 mL/min/1.73 Final   • TSH 11/26/2024 6.110 (H)  0.270 - 4.200 uIU/mL Final   • Free T4 11/26/2024 1.19  0.92 - 1.68 ng/dL Final   Lab on 10/28/2024   Component Date Value Ref Range Status   • Hepatitis C Ab 10/28/2024 Non-Reactive  Non-Reactive Final   • Glucose 10/28/2024 130 (H)  65 - 99 mg/dL Final   • BUN 10/28/2024 14  6 - 20 mg/dL Final   • Creatinine 10/28/2024 1.05 (H)  0.57 - 1.00 mg/dL Final   • Sodium 10/28/2024 137  136 - 145 mmol/L Final   • Potassium 10/28/2024 4.2  3.5 - 5.2 mmol/L Final   • Chloride 10/28/2024 102  98 - 107 mmol/L Final   • CO2 10/28/2024 27.4  22.0 - 29.0 mmol/L Final   • Calcium 10/28/2024 10.1  8.6 - 10.5 mg/dL Final   • Total Protein 10/28/2024 7.7  6.0 - 8.5 g/dL Final   • Albumin 10/28/2024 4.4  3.5 - 5.2 g/dL Final   • ALT (SGPT) 10/28/2024 34 (H)  1 - 33 U/L Final   • AST (SGOT) 10/28/2024 24  1 - 32 U/L Final   • Alkaline Phosphatase 10/28/2024 99  39 - 117 U/L Final   • Total Bilirubin 10/28/2024 0.6  0.0 - 1.2 mg/dL Final   • Globulin 10/28/2024 3.3  gm/dL Final   • A/G Ratio 10/28/2024  1.3  g/dL Final   • BUN/Creatinine Ratio 10/28/2024 13.3  7.0 - 25.0 Final   • Anion Gap 10/28/2024 7.6  5.0 - 15.0 mmol/L Final   • eGFR 10/28/2024 61.7  >60.0 mL/min/1.73 Final   • Hemoglobin A1C 10/28/2024 6.70 (H)  4.80 - 5.60 % Final   • Total Cholesterol 10/28/2024 167  0 - 200 mg/dL Final   • Triglycerides 10/28/2024 78  0 - 150 mg/dL Final   • HDL Cholesterol 10/28/2024 51  40 - 60 mg/dL Final   • LDL Cholesterol  10/28/2024 101 (H)  0 - 100 mg/dL Final   • VLDL Cholesterol 10/28/2024 15  5 - 40 mg/dL Final   • LDL/HDL Ratio 10/28/2024 1.97   Final   • TSH 10/28/2024 5.690 (H)  0.270 - 4.200 uIU/mL Final   • 25 Hydroxy, Vitamin D 10/28/2024 25.6 (L)  30.0 - 100.0 ng/ml Final   • Vitamin B-12 10/28/2024 604  211 - 946 pg/mL Final   • Iron 10/28/2024 134  37 - 145 mcg/dL Final   • Iron Saturation (TSAT) 10/28/2024 34  20 - 50 % Final   • Transferrin 10/28/2024 268  200 - 360 mg/dL Final   • TIBC 10/28/2024 399  298 - 536 mcg/dL Final   • Ferritin 10/28/2024 194.00 (H)  13.00 - 150.00 ng/mL Final   • Folate 10/28/2024 7.91  4.78 - 24.20 ng/mL Final   • FSH 10/28/2024 44.50  mIU/mL Final   • LH 10/28/2024 23.80  mIU/mL Final   • Prolactin 10/28/2024 8.38  4.79 - 23.30 ng/mL Final   • Estrogen 10/28/2024 87  40 - 244 pg/mL Final                             Prepubertal             < 40                           Female Cycle:                             1-10 Days         16 - 328                             11-20 Days        34 - 501                             21-30 Days        48 - 350                             Post-Menopausal   40 - 244   • DHEA 10/28/2024 238  21 - 402 ng/dL Final   • WBC 10/28/2024 6.77  3.40 - 10.80 10*3/mm3 Final   • RBC 10/28/2024 4.81  3.77 - 5.28 10*6/mm3 Final   • Hemoglobin 10/28/2024 15.6  12.0 - 15.9 g/dL Final   • Hematocrit 10/28/2024 45.4  34.0 - 46.6 % Final   • MCV 10/28/2024 94.4  79.0 - 97.0 fL Final   • MCH 10/28/2024 32.4  26.6 - 33.0 pg Final   • MCHC 10/28/2024 34.4  31.5  - 35.7 g/dL Final   • RDW 10/28/2024 12.1 (L)  12.3 - 15.4 % Final   • RDW-SD 10/28/2024 41.6  37.0 - 54.0 fl Final   • MPV 10/28/2024 10.1  6.0 - 12.0 fL Final   • Platelets 10/28/2024 302  140 - 450 10*3/mm3 Final   • Neutrophil % 10/28/2024 62.0  42.7 - 76.0 % Final   • Lymphocyte % 10/28/2024 30.4  19.6 - 45.3 % Final   • Monocyte % 10/28/2024 4.4 (L)  5.0 - 12.0 % Final   • Eosinophil % 10/28/2024 2.2  0.3 - 6.2 % Final   • Basophil % 10/28/2024 0.7  0.0 - 1.5 % Final   • Immature Grans % 10/28/2024 0.3  0.0 - 0.5 % Final   • Neutrophils, Absolute 10/28/2024 4.19  1.70 - 7.00 10*3/mm3 Final   • Lymphocytes, Absolute 10/28/2024 2.06  0.70 - 3.10 10*3/mm3 Final   • Monocytes, Absolute 10/28/2024 0.30  0.10 - 0.90 10*3/mm3 Final   • Eosinophils, Absolute 10/28/2024 0.15  0.00 - 0.40 10*3/mm3 Final   • Basophils, Absolute 10/28/2024 0.05  0.00 - 0.20 10*3/mm3 Final   • Immature Grans, Absolute 10/28/2024 0.02  0.00 - 0.05 10*3/mm3 Final   • nRBC 10/28/2024 0.0  0.0 - 0.2 /100 WBC Final   • Free T4 10/28/2024 1.18  0.92 - 1.68 ng/dL Final        Radiology Results        Assessment / Plan         Assessment and Plan   Diagnoses and all orders for this visit:    1. Hypothyroidism, unspecified type (Primary)  Assessment & Plan:  Since she is still still symptomatic I suspect that her TSH is still elevated despite being on Synthroid 25 mcg daily, I am going go ahead and increase to 50 mcg daily and have patient repeat her thyroid studies in 4 to 6 weeks.    Orders:  -     levothyroxine (Synthroid) 50 MCG tablet; Take 1 tablet by mouth Daily.  Dispense: 90 tablet; Refill: 1  -     TSH Rfx On Abnormal To Free T4; Future    2. Prediabetes  Assessment & Plan:  Repeat HgA1c 6.1% in office today     Orders:  -     POC Glycosylated Hemoglobin (Hb A1C)          Discussed possible differential diagnoses, testing, treatment, recommended non-pharmacological interventions, risks, warning signs to monitor for that would indicate  need for follow-up in clinic or ER. If no improvement with these regimens or you have new or worsening symptoms follow-up. Patient verbalizes understanding and agreement with plan of care. Denies further needs or concerns.     Patient was given instructions and counseling regarding her condition and for health maintenance advised.    Class 2 Severe Obesity (BMI >=35 and <=39.9). Obesity-related health conditions include the following: none. Obesity is unchanged. BMI is is above average; BMI management plan is completed. We discussed Information on healthy weight added to patient's after visit summary.                  Health Maintenance  Health Maintenance:   Health Maintenance Due   Topic Date Due   • PAP SMEAR  Never done   • COVID-19 Vaccine (4 - 2024-25 season) 09/01/2024        Meds ordered during this visit  New Medications Ordered This Visit   Medications   • levothyroxine (Synthroid) 50 MCG tablet     Sig: Take 1 tablet by mouth Daily.     Dispense:  90 tablet     Refill:  1       Meds stopped during this visit:  Medications Discontinued During This Encounter   Medication Reason   • multivitamin with minerals (Multivitamin Women) tablet tablet *Therapy completed   • levothyroxine (Synthroid) 25 MCG tablet Reorder        Visit Diagnoses    ICD-10-CM ICD-9-CM   1. Hypothyroidism, unspecified type  E03.9 244.9   2. Prediabetes  R73.03 790.29         Patient was given instructions and counseling regarding her condition or for health maintenance advice. Please see specific information pulled into the AVS if appropriate.     Follow Up   Return in about 3 months (around 4/21/2025) for followup.      This document has been electronically signed by Marzena Whittington DO   January 23, 2025 10:09 EST    Dictated Utilizing Dragon Dictation: Part of this note may be an electronic transcription/translation of spoken language to printed text using the Dragon Dictation System.    Marzena Whittington D.O.  [facility] [place]  [place]

## 2025-03-10 ENCOUNTER — LAB (OUTPATIENT)
Facility: HOSPITAL | Age: 60
End: 2025-03-10
Payer: COMMERCIAL

## 2025-03-10 ENCOUNTER — OFFICE VISIT (OUTPATIENT)
Age: 60
End: 2025-03-10
Payer: COMMERCIAL

## 2025-03-10 VITALS
SYSTOLIC BLOOD PRESSURE: 114 MMHG | HEIGHT: 69 IN | DIASTOLIC BLOOD PRESSURE: 70 MMHG | BODY MASS INDEX: 35.7 KG/M2 | WEIGHT: 241 LBS

## 2025-03-10 DIAGNOSIS — E03.9 HYPOTHYROIDISM (ACQUIRED): ICD-10-CM

## 2025-03-10 DIAGNOSIS — R73.09 ELEVATED HEMOGLOBIN A1C: ICD-10-CM

## 2025-03-10 DIAGNOSIS — N95.1 MENOPAUSAL SYMPTOMS: ICD-10-CM

## 2025-03-10 DIAGNOSIS — Z01.419 WELL WOMAN EXAM WITH ROUTINE GYNECOLOGICAL EXAM: Primary | ICD-10-CM

## 2025-03-10 DIAGNOSIS — E03.9 HYPOTHYROIDISM, UNSPECIFIED TYPE: ICD-10-CM

## 2025-03-10 PROCEDURE — 84443 ASSAY THYROID STIM HORMONE: CPT

## 2025-03-10 RX ORDER — PROGESTERONE 100 MG/1
100 CAPSULE ORAL DAILY
Qty: 90 CAPSULE | Refills: 1 | Status: SHIPPED | OUTPATIENT
Start: 2025-03-10

## 2025-03-10 RX ORDER — ESTRADIOL 1 MG/1
0.5 TABLET ORAL DAILY
Qty: 90 TABLET | Refills: 3 | Status: SHIPPED | OUTPATIENT
Start: 2025-03-10

## 2025-03-10 NOTE — PROGRESS NOTES
Subjective   Chief Complaint   Patient presents with    Gynecologic Exam     NGYN- Annual, pt c/o hot flashes, night sweats, anxiety, brain fog, decreased libido and low energy.     Amanda Pack is a 60 y.o. year old  menopausal female presenting to be seen for her annual exam.  This past year she has not been on hormone replacement therapy.  There has not been vaginal bleeding in the last 12 months.  Menopausal symptoms are present (decreased libido, no energy, vaginal dryness, and hot flashes, brain fog, some anxiety) and are significantly affecting her quality of life.  She states that since she had her uterine ablation in  she has not had any bleeding.  She states her menopausal symptoms started about 6 to 7 years ago.  She has not been treated for her symptoms in the past.  She is currently being treated for hypothyroidism.  Her labs in November showed elevated TSH.  Her dose was increased from 25 mics of Synthroid to 50 at that time.  She was to have labs rechecked in 6 weeks however when she went to the lab in January they collected her hemoglobin A1c but did not collect her thyroid labs which were ordered.  Her hemoglobin A1c was at that time was 6.1 which is decreased from 6.7 on prior check.  She is not on anything for her hemoglobin A1c.  She has been changing her diet and increasing her exercise.  She has decreased her carbs.  She states she sleeps well overall but no matter how much she sleeps she feels fatigued.  She is bothered by brain fog and fatigue as she works many hours and has noticed decreased concentration at her job.  She does have some vaginal atrophy symptoms.  She is not sexually active very often due to this and her decreased libido and fatigue.  She is up-to-date on her mammogram with her next screening scheduled for next month.  She is up-to-date on her colonoscopy with her last 2023 with recommendation for 5-year follow-up.  She has had 3 full-term   "deliveries.  She has 3 daughters.  1 in [place] the other 2 out of state but closer to [place].  She has 2 grandsons 1 in [place] and 1 in [place].  She and her  travel a lot for their jobs in .  She denies family history of breast cancer.  She does have family history of colon cancer.  She is taking vitamin D for history of vitamin D deficiency.    SEXUAL Hx:  She is currently sexually active.  In the past year there there has been NO new sexual partners.    Condoms are never used.  She would not like to be screened for STD's at today's exam.  [place] is painful: yes - but OTC lubricants ARE effective  HEALTH Hx:  She exercises regularly: yes.  She wears her seat belt: yes.  She has concerns about domestic violence: no.  She has noticed changes in height: no.  OTHER THINGS SHE WANTS TO DISCUSS TODAY:  Nothing else    The following portions of the patient's history were reviewed and updated as appropriate:problem list, current medications, allergies, past family history, past medical history, past social history, and past surgical history.    Social History    Tobacco Use      Smoking status: Never        Passive exposure: Never      Smokeless tobacco: Never      Review of Systems  Constitutional POS: nothing reported    NEG: anorexia or night sweats   Genitourinary POS: nothing reported    NEG: dysuria or hematuria      Gastointestinal POS: nothing reported    NEG: bloating, change in bowel habits, melena, or reflux symptoms   Integument POS: nothing reported    NEG: moles that are changing in size, shape, color or rashes   Breast POS: nothing reported    NEG: persistent breast lump, skin dimpling, or nipple discharge        Objective   /70 (BP Location: Left arm, Patient Position: Sitting, Cuff Size: Large Adult)   Ht 175.3 cm (69.02\")   Wt 109 kg (241 lb)   LMP  (LMP Unknown)   Breastfeeding No   BMI 35.57 kg/m²     General:  well developed; well nourished  no acute " distress  mentation appropriate   Skin:  No suspicious lesions seen   Thyroid: normal to inspection and palpation   Breasts:  Examined in supine position  Symmetric without masses or skin dimpling  Nipples normal without inversion, lesions or discharge  There are no palpable axillary nodes  Bilateral breast reduction scars are noted   Abdomen: soft, non-tender; no masses  no umbilical or inguinal hernias are present  no hepato-splenomegaly   Pelvis: Clinical staff was present for exam  External genitalia:  normal appearance of the external genitalia including Bartholin's and [place]'s glands.  :  urethral meatus normal; urethra normal:  Vaginal:  atrophic mucosal changes are present;  Cervix:  normal appearance.  Uterus:  normal size, shape and consistency.  Adnexa:  normal bimanual exam of the adnexa.  Rectal:  digital rectal exam not performed; anus visually normal appearing.        Assessment   Well woman with routine gynecological exam  She is up to date on all relevant gynecologic and colorectal screenings  Postmenopausal status with bothersome menopausal symptoms.  Not currently on hormone replacement.       Plan will start at 0.5 mg Estrace daily oral.  Discussed need for progesterone for uterine lining protection.  Will start progesterone 100 mg capsule p.o. nightly.  Discussed can take 4 to 6 weeks for full benefit.  Will have her return to care in 6 weeks for reevaluation of symptoms and dose increase if needed.  Pap was done today.  If she does not receive the results of the Pap within 2 weeks  time, she was instructed to call to find out the results.  I explained to Amanda that the recommendations for Pap smear interval in a low risk patient's has lengthened to 3 years time.  I encouraged her to be seen yearly for a full physical exam including breast and pelvic exam even during the off years when PAP's will not be performed.  She was encouraged to get yearly mammograms.  She should report any palpable  breast lump(s) or skin changes regardless of mammographic findings.  I explained to Amanda that notification regarding her mammogram results will come from the center performing the study.  Our office will not be routinely calling with mammogram results.  It is her responsibility to make sure that the results from the mammogram are communicated to her by the breast center.  If she has any questions about the results, she is welcome to call our office anytime.  Discussed menopausal treatment options.  Discussed some of her symptoms may be related to her hypothyroidism and elevated hemoglobin A1c.  She will have her TSH rechecked today.  She plans to speak with her PCP about possible metformin use for her elevated hemoglobin A1c.  She is interested in estrogen and progesterone replacement therapy.  Will start Estrace 0.5 mg p.o. daily.  Will start progesterone 100 mg capsule p.o. daily for uterine protection.  Will have her return to care in 6 weeks for follow-up.  Discussed can take 4 to 6 weeks for premed and symptoms.  Consider dose increase in 6 weeks if no improvement.  Discussed estrogen therapy not indicated for decreased libido however treating her other symptoms may increase her libido if she decreases her vaginal atrophy symptoms and her fatigue.  The importance of keeping all planned follow-up and taking all medications as prescribed was emphasized.  Today I discussed with Amanda the total recommended calcium intake for a post-menopausal female is 1200 mg.  Ideally this should be from dietary sources.  I reviewed calcium content in various foods including milk, fortified orange juice and yogurt.  If she cannot get sufficient calcium through dietary means, it is recommended to supplement with either a multivitamin or calcium to reach her daily goal.  I also reviewed the difference in the bioavailability of calcium carbonate and calcium citrate containing supplements and the importance of taking calcium  carbonate containing products with food. Finally, vitamin D's role in calcium absorption was reviewed and a total daily vitamin D intake of 600 units was recommended.  Her vaccine record was reviewed and updated.  Follow up for annual exam 1 year and in 6 weeks as noted for follow-up above    No orders of the defined types were placed in this encounter.         This note was electronically signed.  Cynthia Foreman, GORDO  March 10, 2025

## 2025-03-11 LAB
REF LAB TEST METHOD: NORMAL
TSH SERPL DL<=0.05 MIU/L-ACNC: 3.08 UIU/ML (ref 0.27–4.2)

## 2025-03-11 RX ORDER — FLUCONAZOLE 150 MG/1
150 TABLET ORAL DAILY
Qty: 1 TABLET | Refills: 0 | Status: SHIPPED | OUTPATIENT
Start: 2025-03-11

## 2025-04-14 ENCOUNTER — HOSPITAL ENCOUNTER (OUTPATIENT)
Facility: HOSPITAL | Age: 60
Discharge: HOME | End: 2025-04-14
Admitting: FAMILY MEDICINE
Payer: COMMERCIAL

## 2025-04-14 DIAGNOSIS — R92.8 ABNORMAL MAMMOGRAM: ICD-10-CM

## 2025-04-14 PROCEDURE — 77065 DX MAMMO INCL CAD UNI: CPT

## 2025-04-14 PROCEDURE — 77065 DX MAMMO INCL CAD UNI: CPT | Performed by: RADIOLOGY

## 2025-04-21 ENCOUNTER — OFFICE VISIT (OUTPATIENT)
Age: 60
End: 2025-04-21
Payer: COMMERCIAL

## 2025-04-21 VITALS
SYSTOLIC BLOOD PRESSURE: 130 MMHG | DIASTOLIC BLOOD PRESSURE: 80 MMHG | HEIGHT: 69 IN | WEIGHT: 238.3 LBS | BODY MASS INDEX: 35.29 KG/M2

## 2025-04-21 DIAGNOSIS — Z79.890 POST-MENOPAUSE ON HRT (HORMONE REPLACEMENT THERAPY): ICD-10-CM

## 2025-04-21 DIAGNOSIS — N95.1 MENOPAUSAL SYMPTOMS: Primary | ICD-10-CM

## 2025-04-21 PROCEDURE — 99213 OFFICE O/P EST LOW 20 MIN: CPT | Performed by: NURSE PRACTITIONER

## 2025-04-21 RX ORDER — PROGESTERONE 100 MG/1
100 CAPSULE ORAL DAILY
Qty: 90 CAPSULE | Refills: 3 | Status: SHIPPED | OUTPATIENT
Start: 2025-04-21

## 2025-04-21 RX ORDER — ESTRADIOL 1 MG/1
1 TABLET ORAL DAILY
Qty: 90 TABLET | Refills: 3 | Status: SHIPPED | OUTPATIENT
Start: 2025-04-21

## 2025-04-21 NOTE — PROGRESS NOTES
"Subjective   Chief Complaint   Patient presents with    Follow-up     HRT     Amanda Pack is a 60 y.o. year old .  No LMP recorded (lmp unknown). Patient has had an ablation.  She presents to be seen for follow-up on HRT start.  At the time of her annual in March she was started on 0.5 mg Estrace daily with 100 mg of progesterone nightly.  Her most bothersome side effects were hot flashes, night sweats, anxiety, brain fog, decreased libido and low energy.  She has not had any uterine bleeding since her ablation in .  Her postmenopausal symptoms started 6 to 7 years ago.  This is the first time she has taken hormonal treatment for her symptoms.  She started medication for about a week but was having stomach upset. She stopped medication in case this was causing her stomach pain and she was going to travel.  When she return from her travel she restarted her HRT and has not had any further stomach upset. She has been taking HRT steady for about 5 weeks. She states her hot flashes are improving, she is sleeping better, continues to have some night sweats but improved. She feels her energy levels are starting to improve and she feels less anxious. She denies any PMB.   The following portions of the patient's history were reviewed and updated as appropriate:current medications and allergies    Social History    Tobacco Use      Smoking status: Never        Passive exposure: Never      Smokeless tobacco: Never         Objective   /80 (BP Location: Left arm, Patient Position: Sitting, Cuff Size: Adult)   Ht 175.3 cm (69\")   Wt 108 kg (238 lb 4.8 oz)   LMP  (LMP Unknown)   BMI 35.19 kg/m²     Lab Review   No data reviewed    Imaging   No data reviewed        Assessment   Postmenopausal symptoms affecting ADLs- improving with HRT      Plan   Will increase oral estrogen to 1mg po daily, continue 100mg progesterone daily, if any pmb notify provider   The importance of keeping all planned follow-up and " taking all medications as prescribed was emphasized.  Rtc for annual or prn       No orders of the defined types were placed in this encounter.         This note was electronically signed.    Cynthia Foreman, GORDO  April 21, 2025

## 2025-08-01 DIAGNOSIS — E03.9 HYPOTHYROIDISM, UNSPECIFIED TYPE: ICD-10-CM

## 2025-08-01 RX ORDER — LEVOTHYROXINE SODIUM 50 UG/1
50 TABLET ORAL DAILY
Qty: 90 TABLET | Refills: 0 | Status: SHIPPED | OUTPATIENT
Start: 2025-08-01

## 2025-08-04 ENCOUNTER — LAB (OUTPATIENT)
Dept: LAB | Facility: HOSPITAL | Age: 60
End: 2025-08-04
Payer: COMMERCIAL

## 2025-08-04 ENCOUNTER — OFFICE VISIT (OUTPATIENT)
Dept: FAMILY MEDICINE CLINIC | Facility: CLINIC | Age: 60
End: 2025-08-04
Payer: COMMERCIAL

## 2025-08-04 VITALS
SYSTOLIC BLOOD PRESSURE: 132 MMHG | TEMPERATURE: 98.7 F | WEIGHT: 237 LBS | BODY MASS INDEX: 35.1 KG/M2 | DIASTOLIC BLOOD PRESSURE: 78 MMHG | HEART RATE: 80 BPM | HEIGHT: 69 IN

## 2025-08-04 DIAGNOSIS — H93.11 TINNITUS OF RIGHT EAR: Primary | ICD-10-CM

## 2025-08-04 DIAGNOSIS — R07.89 SENSATION OF CHEST TIGHTNESS: ICD-10-CM

## 2025-08-04 DIAGNOSIS — K21.9 GASTROESOPHAGEAL REFLUX DISEASE, UNSPECIFIED WHETHER ESOPHAGITIS PRESENT: ICD-10-CM

## 2025-08-04 DIAGNOSIS — H91.90 HEARING LOSS, UNSPECIFIED HEARING LOSS TYPE, UNSPECIFIED LATERALITY: ICD-10-CM

## 2025-08-04 PROBLEM — E66.812 CLASS 2 OBESITY WITHOUT SERIOUS COMORBIDITY WITH BODY MASS INDEX (BMI) OF 35.0 TO 35.9 IN ADULT: Status: ACTIVE | Noted: 2022-07-06

## 2025-08-04 PROBLEM — N95.1 MENOPAUSAL STATE: Status: ACTIVE | Noted: 2022-03-16

## 2025-08-04 PROCEDURE — 93000 ELECTROCARDIOGRAM COMPLETE: CPT | Performed by: FAMILY MEDICINE

## 2025-08-04 PROCEDURE — 99214 OFFICE O/P EST MOD 30 MIN: CPT | Performed by: FAMILY MEDICINE

## 2025-08-04 PROCEDURE — 83013 H PYLORI (C-13) BREATH: CPT

## 2025-08-04 RX ORDER — PANTOPRAZOLE SODIUM 40 MG/1
40 TABLET, DELAYED RELEASE ORAL DAILY
Qty: 30 TABLET | Refills: 2 | Status: SHIPPED | OUTPATIENT
Start: 2025-08-04

## 2025-08-06 LAB — UREA BREATH TEST QL: NEGATIVE
